# Patient Record
Sex: MALE | Race: WHITE | NOT HISPANIC OR LATINO | Employment: FULL TIME | URBAN - METROPOLITAN AREA
[De-identification: names, ages, dates, MRNs, and addresses within clinical notes are randomized per-mention and may not be internally consistent; named-entity substitution may affect disease eponyms.]

---

## 2022-05-19 ENCOUNTER — TRANSCRIBE ORDERS (OUTPATIENT)
Dept: ADMINISTRATIVE | Facility: HOSPITAL | Age: 47
End: 2022-05-19

## 2022-05-19 ENCOUNTER — HOSPITAL ENCOUNTER (OUTPATIENT)
Dept: GENERAL RADIOLOGY | Facility: HOSPITAL | Age: 47
Discharge: HOME | End: 2022-05-19
Admitting: NURSE PRACTITIONER

## 2022-05-19 DIAGNOSIS — M79.675 GREAT TOE PAIN, LEFT: Primary | ICD-10-CM

## 2022-05-19 PROCEDURE — 73630 X-RAY EXAM OF FOOT: CPT

## 2025-04-05 ENCOUNTER — APPOINTMENT (OUTPATIENT)
Dept: CT IMAGING | Facility: HOSPITAL | Age: 50
End: 2025-04-05
Payer: COMMERCIAL

## 2025-04-05 ENCOUNTER — HOSPITAL ENCOUNTER (EMERGENCY)
Facility: HOSPITAL | Age: 50
Discharge: HOME | End: 2025-04-05
Attending: EMERGENCY MEDICINE
Payer: COMMERCIAL

## 2025-04-05 VITALS
TEMPERATURE: 98.7 F | WEIGHT: 260 LBS | HEART RATE: 63 BPM | DIASTOLIC BLOOD PRESSURE: 113 MMHG | BODY MASS INDEX: 34.46 KG/M2 | SYSTOLIC BLOOD PRESSURE: 184 MMHG | HEIGHT: 73 IN | OXYGEN SATURATION: 98 % | RESPIRATION RATE: 18 BRPM

## 2025-04-05 DIAGNOSIS — N20.0 BILATERAL KIDNEY STONES: ICD-10-CM

## 2025-04-05 DIAGNOSIS — R03.0 ELEVATED BLOOD PRESSURE READING WITHOUT DIAGNOSIS OF HYPERTENSION: ICD-10-CM

## 2025-04-05 DIAGNOSIS — M54.41 ACUTE RIGHT-SIDED LOW BACK PAIN WITH RIGHT-SIDED SCIATICA: Primary | ICD-10-CM

## 2025-04-05 DIAGNOSIS — M51.362 DEGENERATION OF INTERVERTEBRAL DISC OF LUMBAR REGION WITH DISCOGENIC BACK PAIN AND LOWER EXTREMITY PAIN: ICD-10-CM

## 2025-04-05 DIAGNOSIS — M54.10 RADICULAR LEG PAIN: ICD-10-CM

## 2025-04-05 LAB
ALBUMIN SERPL-MCNC: 4.3 G/DL (ref 3.5–5.2)
ALBUMIN/GLOB SERPL: 1.3 G/DL
ALP SERPL-CCNC: 81 U/L (ref 39–117)
ALT SERPL W P-5'-P-CCNC: 19 U/L (ref 1–41)
ANION GAP SERPL CALCULATED.3IONS-SCNC: 12 MMOL/L (ref 5–15)
AST SERPL-CCNC: 17 U/L (ref 1–40)
BACTERIA UR QL AUTO: NORMAL /HPF
BASOPHILS # BLD AUTO: 0.04 10*3/MM3 (ref 0–0.2)
BASOPHILS NFR BLD AUTO: 0.5 % (ref 0–1.5)
BILIRUB SERPL-MCNC: 0.2 MG/DL (ref 0–1.2)
BILIRUB UR QL STRIP: NEGATIVE
BUN SERPL-MCNC: 13 MG/DL (ref 6–20)
BUN/CREAT SERPL: 13 (ref 7–25)
CALCIUM SPEC-SCNC: 9.5 MG/DL (ref 8.6–10.5)
CHLORIDE SERPL-SCNC: 103 MMOL/L (ref 98–107)
CLARITY UR: ABNORMAL
CO2 SERPL-SCNC: 24 MMOL/L (ref 22–29)
COLOR UR: YELLOW
CREAT SERPL-MCNC: 1 MG/DL (ref 0.76–1.27)
D-LACTATE SERPL-SCNC: 1.3 MMOL/L (ref 0.5–2)
DEPRECATED RDW RBC AUTO: 44.5 FL (ref 37–54)
EGFRCR SERPLBLD CKD-EPI 2021: 92.3 ML/MIN/1.73
EOSINOPHIL # BLD AUTO: 0.15 10*3/MM3 (ref 0–0.4)
EOSINOPHIL NFR BLD AUTO: 2 % (ref 0.3–6.2)
ERYTHROCYTE [DISTWIDTH] IN BLOOD BY AUTOMATED COUNT: 14.6 % (ref 12.3–15.4)
GLOBULIN UR ELPH-MCNC: 3.2 GM/DL
GLUCOSE SERPL-MCNC: 103 MG/DL (ref 65–99)
GLUCOSE UR STRIP-MCNC: NEGATIVE MG/DL
HCT VFR BLD AUTO: 43 % (ref 37.5–51)
HGB BLD-MCNC: 13.9 G/DL (ref 13–17.7)
HGB UR QL STRIP.AUTO: NEGATIVE
HYALINE CASTS UR QL AUTO: NORMAL /LPF
IMM GRANULOCYTES # BLD AUTO: 0.06 10*3/MM3 (ref 0–0.05)
IMM GRANULOCYTES NFR BLD AUTO: 0.8 % (ref 0–0.5)
KETONES UR QL STRIP: NEGATIVE
LEUKOCYTE ESTERASE UR QL STRIP.AUTO: NEGATIVE
LIPASE SERPL-CCNC: 30 U/L (ref 13–60)
LYMPHOCYTES # BLD AUTO: 1.83 10*3/MM3 (ref 0.7–3.1)
LYMPHOCYTES NFR BLD AUTO: 24.7 % (ref 19.6–45.3)
MCH RBC QN AUTO: 26.9 PG (ref 26.6–33)
MCHC RBC AUTO-ENTMCNC: 32.3 G/DL (ref 31.5–35.7)
MCV RBC AUTO: 83.3 FL (ref 79–97)
MONOCYTES # BLD AUTO: 0.66 10*3/MM3 (ref 0.1–0.9)
MONOCYTES NFR BLD AUTO: 8.9 % (ref 5–12)
NEUTROPHILS NFR BLD AUTO: 4.66 10*3/MM3 (ref 1.7–7)
NEUTROPHILS NFR BLD AUTO: 63.1 % (ref 42.7–76)
NITRITE UR QL STRIP: NEGATIVE
NRBC BLD AUTO-RTO: 0 /100 WBC (ref 0–0.2)
PH UR STRIP.AUTO: 7 [PH] (ref 5–8)
PLATELET # BLD AUTO: 219 10*3/MM3 (ref 140–450)
PMV BLD AUTO: 10.5 FL (ref 6–12)
POTASSIUM SERPL-SCNC: 3.9 MMOL/L (ref 3.5–5.2)
PROT SERPL-MCNC: 7.5 G/DL (ref 6–8.5)
PROT UR QL STRIP: NEGATIVE
RBC # BLD AUTO: 5.16 10*6/MM3 (ref 4.14–5.8)
RBC # UR STRIP: NORMAL /HPF
REF LAB TEST METHOD: NORMAL
SODIUM SERPL-SCNC: 139 MMOL/L (ref 136–145)
SP GR UR STRIP: 1.02 (ref 1–1.03)
SQUAMOUS #/AREA URNS HPF: NORMAL /HPF
UROBILINOGEN UR QL STRIP: ABNORMAL
WBC # UR STRIP: NORMAL /HPF
WBC NRBC COR # BLD AUTO: 7.4 10*3/MM3 (ref 3.4–10.8)

## 2025-04-05 PROCEDURE — 36415 COLL VENOUS BLD VENIPUNCTURE: CPT

## 2025-04-05 PROCEDURE — 83690 ASSAY OF LIPASE: CPT | Performed by: EMERGENCY MEDICINE

## 2025-04-05 PROCEDURE — 99284 EMERGENCY DEPT VISIT MOD MDM: CPT

## 2025-04-05 PROCEDURE — 83605 ASSAY OF LACTIC ACID: CPT | Performed by: EMERGENCY MEDICINE

## 2025-04-05 PROCEDURE — 74176 CT ABD & PELVIS W/O CONTRAST: CPT

## 2025-04-05 PROCEDURE — 80053 COMPREHEN METABOLIC PANEL: CPT | Performed by: EMERGENCY MEDICINE

## 2025-04-05 PROCEDURE — 96372 THER/PROPH/DIAG INJ SC/IM: CPT

## 2025-04-05 PROCEDURE — 81001 URINALYSIS AUTO W/SCOPE: CPT | Performed by: EMERGENCY MEDICINE

## 2025-04-05 PROCEDURE — 85025 COMPLETE CBC W/AUTO DIFF WBC: CPT | Performed by: EMERGENCY MEDICINE

## 2025-04-05 PROCEDURE — 25010000002 KETOROLAC TROMETHAMINE PER 15 MG: Performed by: EMERGENCY MEDICINE

## 2025-04-05 RX ORDER — METHOCARBAMOL 500 MG/1
500 TABLET, FILM COATED ORAL 3 TIMES DAILY PRN
Qty: 21 TABLET | Refills: 0 | Status: SHIPPED | OUTPATIENT
Start: 2025-04-05

## 2025-04-05 RX ORDER — LIDOCAINE 50 MG/G
1 PATCH TOPICAL EVERY 24 HOURS
Qty: 15 PATCH | Refills: 0 | Status: SHIPPED | OUTPATIENT
Start: 2025-04-05

## 2025-04-05 RX ORDER — KETOROLAC TROMETHAMINE 30 MG/ML
60 INJECTION, SOLUTION INTRAMUSCULAR; INTRAVENOUS ONCE
Status: COMPLETED | OUTPATIENT
Start: 2025-04-05 | End: 2025-04-05

## 2025-04-05 RX ORDER — ACETAMINOPHEN 500 MG
1000 TABLET ORAL ONCE
Status: COMPLETED | OUTPATIENT
Start: 2025-04-05 | End: 2025-04-05

## 2025-04-05 RX ORDER — DIAZEPAM 5 MG/1
5 TABLET ORAL ONCE
Status: COMPLETED | OUTPATIENT
Start: 2025-04-05 | End: 2025-04-05

## 2025-04-05 RX ORDER — LIDOCAINE 4 G/G
1 PATCH TOPICAL ONCE
Status: DISCONTINUED | OUTPATIENT
Start: 2025-04-05 | End: 2025-04-06 | Stop reason: HOSPADM

## 2025-04-05 RX ADMIN — ACETAMINOPHEN 1000 MG: 500 TABLET, FILM COATED ORAL at 21:03

## 2025-04-05 RX ADMIN — KETOROLAC TROMETHAMINE 60 MG: 30 INJECTION, SOLUTION INTRAMUSCULAR; INTRAVENOUS at 21:03

## 2025-04-05 RX ADMIN — LIDOCAINE 1 PATCH: 4 PATCH TOPICAL at 21:03

## 2025-04-05 RX ADMIN — DIAZEPAM 5 MG: 5 TABLET ORAL at 21:02

## 2025-04-05 NOTE — Clinical Note
[facility] EMERGENCY DEPARTMENT  [address]  [facility] [address]  Phone: [phone]    Jasper Mullen was seen and treated in our emergency department on 4/5/2025.  He may return to work on 04/08/2025.         Thank you for choosing [facility].    Yanira Olivares, RITA

## 2025-04-06 NOTE — ED PROVIDER NOTES
Subjective   History of Present Illness  This is a 49-year-old male that presents to the ER with right lower back pain described as shooting, sharp, and stabbing at times that has been ongoing for the last 5 days.  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  He also goes to the gym, and he was at the gym last week using the weight machines.  Patient started having some right lower back pain 5 days ago, but he denies any recent injury, fall, or acute straining.  He went to the chiropractor x 2 and had adjustments.  It seemed to help his discomfort, but then the pain worsened today.  He has tried heating pad and resting.  He also has tried naproxen, Flexeril, and Biofreeze topically.  He says pain radiates to his right buttock and right posterior upper leg.  He also reports some tingling sensation to the right foot, all of his toes.  Pain is worsened with movement and ambulation.  Patient denies any urinary or bowel changes or incontinence.  He had a normal bowel movement earlier today.  He denies history of chronic low back pain or previous back surgery.  He does have history of kidney stones.  He denies any dysuria, urgency, frequency, or hematuria.  Patient says current symptoms do not feel typical of his kidney stones.  He denies any nausea/vomiting.    History provided by:  Patient  Back Pain  Location:  Lumbar spine (right lower back pain)  Quality:  Shooting and stabbing (Sharp)  Pain severity:  Moderate  Pain is:  Same all the time  Onset quality:  Gradual  Duration:  5 days  Timing:  Constant  Progression:  Worsening  Chronicity:  New  Context: not falling, not jumping from heights and not lifting heavy objects    Context comment:  5-day history of right lower back pain with radiation to right buttock and right posterior upper leg.  Numbness and tingling to the right foot.  Pain worsened with ambulation, bending, and movement.  No urinary or bowel changes or  incontinence.  Relieved by: Sitting position seemed to help better yesterday, but not helping today.  Worsened by:  Ambulation, bending, movement and standing  Ineffective treatments:  Heating pad and being still (Naproxen and Flexeril, Biofreeze)  Associated symptoms: leg pain (right buttocks and right posterior upper leg pain), paresthesias and tingling (tingling to right foot; all toes.)    Associated symptoms: no abdominal pain, no abdominal swelling, no bladder incontinence, no bowel incontinence, no chest pain, no dysuria, no fever, no headaches, no numbness (Patient reports tingling to right foot), no pelvic pain, no perianal numbness, no weakness and no weight loss        Review of Systems   Constitutional:  Positive for appetite change. Negative for activity change, diaphoresis, fatigue, fever and weight loss.   HENT: Negative.     Respiratory: Negative.  Negative for cough and shortness of breath.    Cardiovascular: Negative.  Negative for chest pain.   Gastrointestinal: Negative.  Negative for abdominal pain, bowel incontinence, constipation (Normal BM today), diarrhea, nausea and vomiting.   Genitourinary:  Negative for bladder incontinence, dysuria, enuresis, flank pain, frequency, hematuria, pelvic pain and urgency.        No urinary incontinence. History of kidney stones, but the current sxs feel a little different. History of kidney stones. Last stone was passed in his 20's.   Musculoskeletal:  Positive for back pain (right lower back pain with radicular sxs to right buttocks and right posterior upper leg.) and gait problem.   Skin: Negative.    Neurological:  Positive for tingling (tingling to right foot; all toes.) and paresthesias. Negative for weakness, numbness (Patient reports tingling to right foot) and headaches.        Tingling to right foot/toes.   All other systems reviewed and are negative.      History reviewed. No pertinent past medical history.    No Known Allergies    History  reviewed. No pertinent surgical history.    History reviewed. No pertinent family history.    Social History     Socioeconomic History    Marital status:            Objective   Physical Exam  Vitals and nursing note reviewed.   Constitutional:       General: He is not in acute distress.     Appearance: Normal appearance. He is obese. He is not ill-appearing, toxic-appearing or diaphoretic.      Comments: Appears uncomfortable secondary to right lower back pain.  Nontoxic.  No acute distress.  Obesity with BMI of 34.   HENT:      Head: Normocephalic and atraumatic.      Nose: Nose normal.      Mouth/Throat:      Mouth: Mucous membranes are moist.      Pharynx: Oropharynx is clear.      Comments: Oral mucous membranes are moist  Eyes:      Extraocular Movements: Extraocular movements intact.      Conjunctiva/sclera: Conjunctivae normal.      Pupils: Pupils are equal, round, and reactive to light.   Cardiovascular:      Rate and Rhythm: Normal rate and regular rhythm. No extrasystoles are present.     Pulses: Normal pulses.           Dorsalis pedis pulses are 2+ on the right side and 2+ on the left side.        Posterior tibial pulses are 2+ on the right side and 2+ on the left side.      Heart sounds: Normal heart sounds.      Comments: Regular rate and rhythm.  No tachycardia or ectopy.  No pedal edema to lower extremities.  Strong bilateral DP and PT pulses.  Pulmonary:      Effort: Pulmonary effort is normal.      Breath sounds: Normal breath sounds.      Comments: Lungs are clear to auscultation bilaterally  Abdominal:      General: Bowel sounds are normal. There is no distension.      Palpations: Abdomen is soft.      Tenderness: There is no abdominal tenderness. There is no right CVA tenderness, left CVA tenderness, guarding or rebound. Negative signs include Painting's sign, Rovsing's sign, McBurney's sign, psoas sign and obturator sign.      Hernia: No hernia is present. There is no hernia in the  umbilical area or ventral area.      Comments: Central obesity.  Soft without distention.  Active bowel sounds in all 4 quadrants.  Nontender to palpation.  No flank or CVA tenderness.  No umbilical or ventral herniation.   Musculoskeletal:      Cervical back: Normal, normal range of motion and neck supple.      Thoracic back: Normal.      Lumbar back: Spasms and tenderness present. No swelling, edema, deformity, signs of trauma, lacerations or bony tenderness. Decreased range of motion. Positive right straight leg raise test. Negative left straight leg raise test. No scoliosis.        Back:       Right lower leg: No edema.      Left lower leg: No edema.      Comments: Tenderness to right lumbar myofascia and right buttocks.  Positive right straight leg raise at 60 degrees.  2+ patellar reflexes bilaterally.  Normal dorsi/plantarflexion bilaterally, but pain elicited with dorsi flexion of the right foot.  Painful flexion of the back and painful gait.   Skin:     General: Skin is warm and dry.      Findings: No bruising, ecchymosis, signs of injury, lesion or rash.   Neurological:      General: No focal deficit present.      Mental Status: He is alert and oriented to person, place, and time.      Cranial Nerves: Cranial nerves 2-12 are intact.      Sensory: Sensation is intact.      Motor: Motor function is intact.      Coordination: Coordination is intact.      Deep Tendon Reflexes:      Reflex Scores:       Patellar reflexes are 2+ on the right side and 2+ on the left side.     Comments: Alert and oriented x 3.  Neuro intact and nonfocal   Psychiatric:         Mood and Affect: Mood and affect normal.         Speech: Speech normal.         Behavior: Behavior normal. Behavior is cooperative.         Thought Content: Thought content normal.         Cognition and Memory: Cognition and memory normal.         Judgment: Judgment normal.      Comments: Normal mood and affect         Procedures           ED Course  ED  Course as of 04/06/25 0301   Sat Apr 05, 2025 2007 Pt went to the gym last week with some weight machines, but no heavy straining or concern for injury at that time. Pt is a teacher. No h/o chronic back pain and no previous back surgery. Patient went to chiropractor 3 days ago and went again yesterday. Worsening pain today.   [FC]   8774 CBC and chemistries were completely normal.  Lipase is 30 and LFTs were normal.  Lactic acid is 1.3.  Urinalysis reveals no microscopic blood or acute UTI.  CT of the abdomen/pelvis without contrast reveals no acute abdominal or pelvic abnormality.  Small hiatal hernia.  Nonobstructing bilateral kidney stones.  Stone in the inferior pole of the right kidney measures 4 mm and there is a punctate nonobstructing stone in the left kidney.  Mild degenerative changes at L4-L5 due to osteophyte complex with mild bilateral neuroforaminal narrowing and mild spinal canal narrowing.  Patient having right buttocks and right posterior upper leg pain that is worsened with movement and range of motion.  Suspect musculoskeletal back pain with radicular symptoms.  Patient given Toradol injection, oral Tylenol, oral Valium, and lidocaine 4% patch during the ER course.  I will give him follow-up info for Dr. Shea, neurosurgery.  Recommend no frequent twisting, bending, or lifting and no going to the gym lifting weights.  Do not go to the chiropractor.  Rx for diclofenac, Robaxin, and Lidoderm patches as directed.  Return to the ER if any worsening symptoms. [FC]      ED Course User Index  [FC] Yanira Olivares, RITA                                             Recent Results (from the past 24 hours)   Urinalysis With Microscopic If Indicated (No Culture) - Urine, Clean Catch    Collection Time: 04/05/25  9:03 PM    Specimen: Urine, Clean Catch   Result Value Ref Range    Color, UA Yellow Yellow, Straw    Appearance, UA Turbid (A) Clear    pH, UA 7.0 5.0 - 8.0    Specific Gravity, UA 1.022 1.001 -  1.030    Glucose, UA Negative Negative    Ketones, UA Negative Negative    Bilirubin, UA Negative Negative    Blood, UA Negative Negative    Protein, UA Negative Negative    Leuk Esterase, UA Negative Negative    Nitrite, UA Negative Negative    Urobilinogen, UA 1.0 E.U./dL 0.2 - 1.0 E.U./dL   Urinalysis, Microscopic Only - Urine, Clean Catch    Collection Time: 04/05/25  9:03 PM    Specimen: Urine, Clean Catch   Result Value Ref Range    RBC, UA 0-2 None Seen, 0-2 /HPF    WBC, UA 0-2 None Seen, 0-2 /HPF    Bacteria, UA None Seen None Seen, Trace /HPF    Squamous Epithelial Cells, UA 0-2 None Seen, 0-2 /HPF    Hyaline Casts, UA None Seen 0 - 6 /LPF    Methodology Automated Microscopy    Comprehensive Metabolic Panel    Collection Time: 04/05/25  9:05 PM    Specimen: Blood   Result Value Ref Range    Glucose 103 (H) 65 - 99 mg/dL    BUN 13 6 - 20 mg/dL    Creatinine 1.00 0.76 - 1.27 mg/dL    Sodium 139 136 - 145 mmol/L    Potassium 3.9 3.5 - 5.2 mmol/L    Chloride 103 98 - 107 mmol/L    CO2 24.0 22.0 - 29.0 mmol/L    Calcium 9.5 8.6 - 10.5 mg/dL    Total Protein 7.5 6.0 - 8.5 g/dL    Albumin 4.3 3.5 - 5.2 g/dL    ALT (SGPT) 19 1 - 41 U/L    AST (SGOT) 17 1 - 40 U/L    Alkaline Phosphatase 81 39 - 117 U/L    Total Bilirubin 0.2 0.0 - 1.2 mg/dL    Globulin 3.2 gm/dL    A/G Ratio 1.3 g/dL    BUN/Creatinine Ratio 13.0 7.0 - 25.0    Anion Gap 12.0 5.0 - 15.0 mmol/L    eGFR 92.3 >60.0 mL/min/1.73   Lipase    Collection Time: 04/05/25  9:05 PM    Specimen: Blood   Result Value Ref Range    Lipase 30 13 - 60 U/L   Lactic Acid, Plasma    Collection Time: 04/05/25  9:05 PM    Specimen: Blood   Result Value Ref Range    Lactate 1.3 0.5 - 2.0 mmol/L   CBC Auto Differential    Collection Time: 04/05/25  9:05 PM    Specimen: Blood   Result Value Ref Range    WBC 7.40 3.40 - 10.80 10*3/mm3    RBC 5.16 4.14 - 5.80 10*6/mm3    Hemoglobin 13.9 13.0 - 17.7 g/dL    Hematocrit 43.0 37.5 - 51.0 %    MCV 83.3 79.0 - 97.0 fL    MCH 26.9  "26.6 - 33.0 pg    MCHC 32.3 31.5 - 35.7 g/dL    RDW 14.6 12.3 - 15.4 %    RDW-SD 44.5 37.0 - 54.0 fl    MPV 10.5 6.0 - 12.0 fL    Platelets 219 140 - 450 10*3/mm3    Neutrophil % 63.1 42.7 - 76.0 %    Lymphocyte % 24.7 19.6 - 45.3 %    Monocyte % 8.9 5.0 - 12.0 %    Eosinophil % 2.0 0.3 - 6.2 %    Basophil % 0.5 0.0 - 1.5 %    Immature Grans % 0.8 (H) 0.0 - 0.5 %    Neutrophils, Absolute 4.66 1.70 - 7.00 10*3/mm3    Lymphocytes, Absolute 1.83 0.70 - 3.10 10*3/mm3    Monocytes, Absolute 0.66 0.10 - 0.90 10*3/mm3    Eosinophils, Absolute 0.15 0.00 - 0.40 10*3/mm3    Basophils, Absolute 0.04 0.00 - 0.20 10*3/mm3    Immature Grans, Absolute 0.06 (H) 0.00 - 0.05 10*3/mm3    nRBC 0.0 0.0 - 0.2 /100 WBC     Note: In addition to lab results from this visit, the labs listed above may include labs taken at another facility or during a different encounter within the last 24 hours. Please correlate lab times with ED admission and discharge times for further clarification of the services performed during this visit.    CT Abdomen Pelvis Without Contrast   Final Result   Impression:   1.No acute abdominal or pelvic abnormality.   2.Small hiatal hernia.   3.Nonobstructing bilateral kidney stones.   4.Mild degenerative changes at the L4-L5 disc with mild bilateral neuroforaminal narrowing and mild spinal canal narrowing.                        Electronically Signed: Irvin Messer MD     4/5/2025 9:16 PM EDT     Workstation ID: [id number]        Vitals:    04/05/25 1950 04/05/25 2330 04/05/25 2345   BP: (!) 184/113     Pulse: 68 61 63   Resp: 18     Temp: 98.7 °F (37.1 °C)     SpO2: 98% 98% 98%   Weight: 118 kg (260 lb)     Height: 185.4 cm (73\")       Medications   ketorolac (TORADOL) injection 60 mg (60 mg Intramuscular Given 4/5/25 2103)   acetaminophen (TYLENOL) tablet 1,000 mg (1,000 mg Oral Given 4/5/25 2103)   diazePAM (VALIUM) tablet 5 mg (5 mg Oral Given 4/5/25 2102)     ECG/EMG Results (last 24 hours)       ** No results " found for the last 24 hours. **          No orders to display                 Medical Decision Making  Problems Addressed:  Acute right-sided low back pain with right-sided sciatica: complicated acute illness or injury  Bilateral kidney stones: complicated acute illness or injury  Degeneration of intervertebral disc of lumbar region with discogenic back pain and lower extremity pain: complicated acute illness or injury  Elevated blood pressure reading without diagnosis of hypertension: complicated acute illness or injury  Radicular leg pain: complicated acute illness or injury    Amount and/or Complexity of Data Reviewed  Labs: ordered.  Radiology: ordered.    Risk  OTC drugs.  Prescription drug management.        Final diagnoses:   Acute right-sided low back pain with right-sided sciatica   Degeneration of intervertebral disc of lumbar region with discogenic back pain and lower extremity pain   Radicular leg pain   Bilateral kidney stones   Elevated blood pressure reading without diagnosis of hypertension       ED Disposition  ED Disposition       ED Disposition   Discharge    Condition   Stable    Comment   --               Shaan Shea MD  [address] [address]  [phone]    Call in 2 days  Call Monday for close re-check    [facility] EMERGENCY DEPARTMENT  [address] [facility] [address] [phone]    If symptoms worsen         Medication List        New Prescriptions      diclofenac 50 MG EC tablet  Commonly known as: VOLTAREN  Take 1 tablet by mouth 3 (Three) Times a Day.     lidocaine 5 %  Commonly known as: Lidoderm  Place 1 patch on the skin as directed by provider Daily. Remove & Discard patch within 12 hours or as directed by MD     methocarbamol 500 MG tablet  Commonly known as: ROBAXIN  Take 1 tablet by mouth 3 (Three) Times a Day As Needed for Muscle Spasms.            Stop      cephalexin 500 MG capsule  Commonly known as:  LINDA               Where to Get Your Medications        These medications were sent to [id number] - [place], [place] - [address] AT [facility] & [address] [facility] - [phone]  - [phone]   [address], [facility] [address]      Phone: [phone]   diclofenac 50 MG EC tablet  lidocaine 5 %  methocarbamol 500 MG tablet            Yanira Olivares PAVadimC  04/06/25 0304

## 2025-04-06 NOTE — DISCHARGE INSTRUCTIONS
ER evaluation reveals normal CBC, chemistries, and normal urinalysis.  There was no evidence of urinary tract infection or microscopic blood indicating acute ureteral stone.  CT imaging reveals bilateral kidney stones, 4 mm stone in the right kidney and punctate 1 to 2 mm stone in the left kidney.  Patient had no stone in either ureter.  CT scan revealed mild arthritic changes to the lumbar spine most pronounced at L4-L5 with a osteophyte complex posteriorly and mild bilateral neuroforaminal narrowing and mild central spinal canal narrowing.  Recommend no frequent twisting, bending, or lifting greater than 10 pounds.  Recommend patient not go to the chiropractor until evaluated by neurosurgery.  We gave phone number for Dr. Shea.  Call his office Monday for first available recheck.  Rx for Robaxin, diclofenac, and Lidoderm patches as directed.  Return to the ER if worsening symptoms.

## 2025-04-25 ENCOUNTER — OFFICE VISIT (OUTPATIENT)
Dept: NEUROSURGERY | Facility: CLINIC | Age: 50
End: 2025-04-25
Payer: COMMERCIAL

## 2025-04-25 VITALS
HEIGHT: 73 IN | TEMPERATURE: 98.4 F | SYSTOLIC BLOOD PRESSURE: 134 MMHG | DIASTOLIC BLOOD PRESSURE: 96 MMHG | BODY MASS INDEX: 34.67 KG/M2 | WEIGHT: 261.6 LBS

## 2025-04-25 DIAGNOSIS — M54.41 ACUTE RIGHT-SIDED LOW BACK PAIN WITH RIGHT-SIDED SCIATICA: ICD-10-CM

## 2025-04-25 DIAGNOSIS — M54.17 LUMBOSACRAL RADICULOPATHY AT L5: Primary | ICD-10-CM

## 2025-04-25 DIAGNOSIS — M51.362 DEGENERATION OF INTERVERTEBRAL DISC OF LUMBAR REGION WITH DISCOGENIC BACK PAIN AND LOWER EXTREMITY PAIN: ICD-10-CM

## 2025-04-25 RX ORDER — DICLOFENAC SODIUM 75 MG/1
75 TABLET, DELAYED RELEASE ORAL 2 TIMES DAILY WITH MEALS
COMMUNITY
Start: 2025-04-08

## 2025-04-25 RX ORDER — GABAPENTIN 300 MG/1
1 CAPSULE ORAL 3 TIMES DAILY
COMMUNITY
Start: 2025-04-21

## 2025-04-25 RX ORDER — TRAMADOL HYDROCHLORIDE 50 MG/1
50 TABLET ORAL EVERY 8 HOURS PRN
Qty: 30 TABLET | Refills: 1 | Status: SHIPPED | OUTPATIENT
Start: 2025-04-25

## 2025-04-25 RX ORDER — FLUTICASONE PROPIONATE AND SALMETEROL 250; 50 UG/1; UG/1
1 POWDER RESPIRATORY (INHALATION) 2 TIMES DAILY
COMMUNITY
Start: 2025-02-25

## 2025-04-25 RX ORDER — VALSARTAN AND HYDROCHLOROTHIAZIDE 320; 25 MG/1; MG/1
1 TABLET, FILM COATED ORAL DAILY
COMMUNITY
Start: 2025-04-21

## 2025-04-25 RX ORDER — ALBUTEROL SULFATE 90 UG/1
INHALANT RESPIRATORY (INHALATION)
COMMUNITY
Start: 2025-02-27

## 2025-04-25 RX ORDER — SEMAGLUTIDE 0.68 MG/ML
INJECTION, SOLUTION SUBCUTANEOUS
COMMUNITY
Start: 2025-04-22

## 2025-04-25 RX ORDER — LOSARTAN POTASSIUM AND HYDROCHLOROTHIAZIDE 25; 100 MG/1; MG/1
TABLET ORAL
COMMUNITY

## 2025-04-25 NOTE — PROGRESS NOTES
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  On 2025 he developed significant right-sided low back pain with pain that radiated down the back of the leg to the ankle.  He had some mild back pain in the past but nothing quite like this.  He did see his chiropractor and treatment was not helpful at this time.  He endorses that his pain got worse feeling like a cramp in his back and back of the knee and radiated to the ankle.  He endorses numbness in the foot and tingling toes, it is worse with walking and symptoms improves with sitting.    Chronic Illnesses:    No past medical history on file.      No past surgical history on file.    Allergies   Allergen Reactions    Ace Inhibitors Cough    Shellfish-Derived Products Nausea Only         Current Outpatient Medications:     diclofenac (VOLTAREN) 50 MG EC tablet, Take 1 tablet by mouth 3 (Three) Times a Day., Disp: 21 tablet, Rfl: 0    levocetirizine (XYZAL) 5 MG tablet, , Disp: , Rfl:     lidocaine (Lidoderm) 5 %, Place 1 patch on the skin as directed by provider Daily. Remove & Discard patch within 12 hours or as directed by MD, Disp: 15 patch, Rfl: 0    methocarbamol (ROBAXIN) 500 MG tablet, Take 1 tablet by mouth 3 (Three) Times a Day As Needed for Muscle Spasms., Disp: 21 tablet, Rfl: 0    montelukast (SINGULAIR) 10 MG tablet, Take 10 mg by mouth Daily., Disp: , Rfl:     Symbicort 80-4.5 MCG/ACT inhaler, Inhale 2 puffs 2 (Two) Times a Day., Disp: , Rfl:     UNKNOWN TO PATIENT, Blood pressure medication, Disp: , Rfl:     Social History     Socioeconomic History    Marital status:    Tobacco Use    Passive exposure: Never    Smokeless tobacco: Never   Substance and Sexual Activity    Alcohol use: Yes     Alcohol/week: 3.0 standard drinks of alcohol     Types: 1 Glasses of wine, 1 Cans of beer,  "1 Shots of liquor per week    Drug use: Never    Sexual activity: Defer       No family history on file.    BMI is >= 30 and <35. (Class 1 Obesity). The following options were offered after discussion;: referral to primary care       Social History    Tobacco Use      Smoking status: Not on file        Passive exposure: Never      Smokeless tobacco: Never       Review of Systems       Physical examination:  Blood pressure 134/96, temperature 98.4 °F (36.9 °C), temperature source Infrared, height 185.4 cm (73\"), weight 119 kg (261 lb 9.6 oz).  HEENT- normocephalic, atraumatic, sclera clear  Lungs-normal expansion, no wheezing  Heart-regular rate and rhythm  Extremities-positive pulses, no edema    Neurological Exam   WDWN WM  A/A/C, speech clear, attention normal, conversant, answers questions appropriately, good historian.  Cranial nerves II through XII are intact.  Motor examination does not reveal weakness in the lower extremities.   Sensation is intact.  Gait is normal, balance is normal.   No tremors are noted.  Reflexes are intact in the lower extremities  Gill is negative. Clonus is negative.   Palpation palpation reveals normal alignment.    Radiographic Imaging:  For my review is a CT scan of the abdomen which shows disc degeneration at L4-5 with bulging.          Medical Decision Making  Diagnoses and all orders for this visit:    1. Acute right-sided low back pain with right-sided sciatica (Primary)    2. Degeneration of intervertebral disc of lumbar region with discogenic back pain and lower extremity pain    Mr. Mullen is a 49-year-old gentleman with acute onset of right-sided low back pain and radiculopathy.  Straight leg raising is positive, Achilles reflexes absent.  Given Mr. Mullen's ongoing symptoms without significant improvement with medications and chiropractic therapy he requires a lumbar MRI scan for surgical planning.  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  We " briefly discussed minimal invasive surgery to decompress the L5 nerve root however it will be based upon the findings on the MRI scan.  I have sent him to physical therapy for dry needling to help with his back pain.  Patient is in agreement with this plan.    Any copied data from previous notes included in the (1) HPI, (2) PE, (3) MDM and/or assessment and plan has been reviewed and is accurate as of this day.    Chiquis Nunez, RENE    Patient Care Team:  Kailee Le MD as PCP - General (Internal Medicine)  Valentina Nunez PA-C as Physician Assistant (Physician Assistant)

## 2025-05-25 ENCOUNTER — HOSPITAL ENCOUNTER (OUTPATIENT)
Dept: MRI IMAGING | Facility: HOSPITAL | Age: 50
Discharge: HOME | End: 2025-05-25
Admitting: PHYSICIAN ASSISTANT
Payer: COMMERCIAL

## 2025-05-25 DIAGNOSIS — M54.17 LUMBOSACRAL RADICULOPATHY AT L5: ICD-10-CM

## 2025-05-25 DIAGNOSIS — M51.362 DEGENERATION OF INTERVERTEBRAL DISC OF LUMBAR REGION WITH DISCOGENIC BACK PAIN AND LOWER EXTREMITY PAIN: ICD-10-CM

## 2025-05-25 DIAGNOSIS — M54.41 ACUTE RIGHT-SIDED LOW BACK PAIN WITH RIGHT-SIDED SCIATICA: ICD-10-CM

## 2025-05-25 PROCEDURE — 72148 MRI LUMBAR SPINE W/O DYE: CPT
